# Patient Record
Sex: MALE | Race: WHITE
[De-identification: names, ages, dates, MRNs, and addresses within clinical notes are randomized per-mention and may not be internally consistent; named-entity substitution may affect disease eponyms.]

---

## 2019-03-19 ENCOUNTER — HOSPITAL ENCOUNTER (EMERGENCY)
Dept: HOSPITAL 89 - ER | Age: 34
Discharge: HOME | End: 2019-03-19
Payer: OTHER GOVERNMENT

## 2019-03-19 VITALS — DIASTOLIC BLOOD PRESSURE: 62 MMHG | SYSTOLIC BLOOD PRESSURE: 124 MMHG

## 2019-03-19 DIAGNOSIS — K29.00: Primary | ICD-10-CM

## 2019-03-19 LAB — PLATELET COUNT, AUTOMATED: 228 K/UL (ref 150–450)

## 2019-03-19 PROCEDURE — 84155 ASSAY OF PROTEIN SERUM: CPT

## 2019-03-19 PROCEDURE — 84295 ASSAY OF SERUM SODIUM: CPT

## 2019-03-19 PROCEDURE — 82040 ASSAY OF SERUM ALBUMIN: CPT

## 2019-03-19 PROCEDURE — 85025 COMPLETE CBC W/AUTO DIFF WBC: CPT

## 2019-03-19 PROCEDURE — 84132 ASSAY OF SERUM POTASSIUM: CPT

## 2019-03-19 PROCEDURE — 84075 ASSAY ALKALINE PHOSPHATASE: CPT

## 2019-03-19 PROCEDURE — 84450 TRANSFERASE (AST) (SGOT): CPT

## 2019-03-19 PROCEDURE — 82247 BILIRUBIN TOTAL: CPT

## 2019-03-19 PROCEDURE — 82565 ASSAY OF CREATININE: CPT

## 2019-03-19 PROCEDURE — 84520 ASSAY OF UREA NITROGEN: CPT

## 2019-03-19 PROCEDURE — 84484 ASSAY OF TROPONIN QUANT: CPT

## 2019-03-19 PROCEDURE — 96365 THER/PROPH/DIAG IV INF INIT: CPT

## 2019-03-19 PROCEDURE — 82310 ASSAY OF CALCIUM: CPT

## 2019-03-19 PROCEDURE — 86677 HELICOBACTER PYLORI ANTIBODY: CPT

## 2019-03-19 PROCEDURE — 82947 ASSAY GLUCOSE BLOOD QUANT: CPT

## 2019-03-19 PROCEDURE — 82435 ASSAY OF BLOOD CHLORIDE: CPT

## 2019-03-19 PROCEDURE — 82374 ASSAY BLOOD CARBON DIOXIDE: CPT

## 2019-03-19 PROCEDURE — 93005 ELECTROCARDIOGRAM TRACING: CPT

## 2019-03-19 PROCEDURE — 84460 ALANINE AMINO (ALT) (SGPT): CPT

## 2019-03-19 PROCEDURE — 83690 ASSAY OF LIPASE: CPT

## 2019-03-19 PROCEDURE — 99283 EMERGENCY DEPT VISIT LOW MDM: CPT

## 2019-03-19 NOTE — ER REPORT
History and Physical


Time Seen By MD:  10:25


Hx. of Stated Complaint:  


epigastric pain into throat since 6am. worse with eating/drinking/deep breathing


HPI/ROS


CHIEF COMPLAINT: Epigastric abdominal pain





HISTORY OF PRESENT ILLNESS: Patient is a 33-year-old male who presents to the 


emergency department with complaint of worsening epigastric abdominal pain 


episodes and symptoms of reflux over the last year. His pertinent past medical 


history is that he had his gallbladder taken out in 2012 for cholelithiasis and 


cholecystitis. Patient denies any fevers or chills. He states that the pain can 


come both with and without exertion and can last for 20-30 minutes. He states 


there is some relation to food but the pain can, if he has not eaten as well. He


denies any change in his bladder or bowel function. He denies shortness of 


breath. Denies any blood in his stools. Denies any black or tarry stools. 


Patient states he woke with this discomfort around 6 AM this morning that was 


fairly severe and unremitting. Upon arrival to the emergency department symptoms


are improving. He describes the discomfort as a tightness in the chest and 


epigastric area and also feels as if he is having reflux into his throat.





REVIEW OF SYSTEMS:


Constitutional: No fever, no chills.


Eyes: No discharge.


ENT: No sore throat. 


Cardiovascular: No chest pain, no palpitations.


Respiratory: No cough, no shortness of breath.


Gastrointestinal: Epigastric abdominal pain


Genitourinary: No hematuria.


Musculoskeletal: No back pain.


Skin: No rashes.


Neurological: No headache.


Allergies:  


Uncoded Allergies:  


     flu vaccine (Adverse Reaction, Intermediate, 3/19/19)


Home Meds


Reported Medications


Sertraline Hcl (SERTRALINE HCL) 50 Mg Tablet, 1 TAB PO QDAY, TAB


   3/19/19


Discontinued Reported Medications


[None]   No Conflict Check


   7/2/13


Past Medical/Surgical History


Past medical history for cholelithiasis and cholecystitis status post 


laparoscopic cholecystectomy 1/10/2012


Hx Smoking:  No


Hx Substance Use Disorder:  No


Hx Alcohol Use:  Yes (RARE)


Constitutional





Vital Sign - Last 24 Hours








 3/19/19





 10:13


 


Temp 99.2


 


Pulse 107


 


Resp 16


 


B/P (MAP) 134/86


 


Pulse Ox 93


 


O2 Delivery Room Air








Physical Exam


   General Appearance: The patient is alert, has no immediate need for airway 


protection and no signs of toxicity. 


Eyes: Pupils equal and round no pallor or injection.


ENT, Mouth: Mucous membranes are moist.


Respiratory: There are no retractions, lungs are clear to auscultation.


Cardiovascular: Regular rate and rhythm. 


Gastrointestinal:  Abdomen is soft and non tender, no masses, bowel sounds 


normal.


Neurological: Awake and alert


Skin: Warm and dry, no rashes.


Musculoskeletal:  Neck is supple non tender.


      Extremities are nontender, nonswollen and have full range of motion.





Medical Decision Making


Data Points


Result Diagram:  


3/19/19 1015                                                                    


            3/19/19 1015





Laboratory





Hematology








Test


 3/19/19


10:15


 


Red Blood Count


 5.37 M/uL


(4.00-5.60)


 


Mean Corpuscular Volume


 91.8 fL


(80.0-96.0)


 


Mean Corpuscular Hemoglobin


 31.9 pg


(26.0-33.0)


 


Mean Corpuscular Hemoglobin


Concent 34.7 g/dL


(32.0-36.0)


 


Red Cell Distribution Width


 13.4 %


(11.5-14.5)


 


Mean Platelet Volume


 7.9 fL


(7.2-11.1)


 


Neutrophils (%) (Auto)


 81.6 %


(39.4-72.5)


 


Lymphocytes (%) (Auto)


 10.4 %


(17.6-49.6)


 


Monocytes (%) (Auto)


 7.0 %


(4.1-12.4)


 


Eosinophils (%) (Auto)


 0.6 %


(0.4-6.7)


 


Basophils (%) (Auto)


 0.4 %


(0.3-1.4)


 


Nucleated RBC Relative Count


(auto) 0.0 /100WBC 





 


Neutrophils # (Auto)


 14.1 K/uL


(2.0-7.4)


 


Lymphocytes # (Auto)


 1.8 K/uL


(1.3-3.6)


 


Monocytes # (Auto)


 1.2 K/uL


(0.3-1.0)


 


Eosinophils # (Auto)


 0.1 K/uL


(0.0-0.5)


 


Basophils # (Auto)


 0.1 K/uL


(0.0-0.1)


 


Nucleated RBC Absolute Count


(auto) 0.00 K/uL 





 


Sodium Level


 141 mmol/L


(137-145)


 


Potassium Level


 4.1 mmol/L


(3.5-5.0)


 


Chloride Level


 109 mmol/L


()


 


Carbon Dioxide Level


 24 mmol/L


(22-30)


 


Blood Urea Nitrogen


 17 mg/dl


(9-21)


 


Creatinine


 1.00 mg/dl


(0.66-1.25)


 


Glomerular Filtration Rate


Calc > 60.0 





 


Random Glucose


 71 mg/dl


()


 


Calcium Level


 9.5 mg/dl


(8.4-10.2)


 


Total Bilirubin


 0.9 mg/dl


(0.2-1.3)


 


Aspartate Amino Transf


(AST/SGOT) 29 U/L (0-35) 





 


Alanine Aminotransferase


(ALT/SGPT) 22 U/L (0-56) 





 


Alkaline Phosphatase 51 U/L (0-126) 


 


Troponin I < 0.012 ng/ml 


 


Total Protein


 7.7 g/dl


(6.3-8.2)


 


Albumin


 4.6 g/dl


(3.5-5.0)


 


Lipase


 90 U/L


()


 


Helicobacter pylori IgG


Antibody Negative


(NEGATIVE)








Chemistry








Test


 3/19/19


10:15


 


White Blood Count


 17.3 k/uL


(4.5-11.0)


 


Red Blood Count


 5.37 M/uL


(4.00-5.60)


 


Hemoglobin


 17.1 g/dL


(14.0-18.0)


 


Hematocrit


 49.3 %


(42.0-52.0)


 


Mean Corpuscular Volume


 91.8 fL


(80.0-96.0)


 


Mean Corpuscular Hemoglobin


 31.9 pg


(26.0-33.0)


 


Mean Corpuscular Hemoglobin


Concent 34.7 g/dL


(32.0-36.0)


 


Red Cell Distribution Width


 13.4 %


(11.5-14.5)


 


Platelet Count


 228 K/uL


(150-450)


 


Mean Platelet Volume


 7.9 fL


(7.2-11.1)


 


Neutrophils (%) (Auto)


 81.6 %


(39.4-72.5)


 


Lymphocytes (%) (Auto)


 10.4 %


(17.6-49.6)


 


Monocytes (%) (Auto)


 7.0 %


(4.1-12.4)


 


Eosinophils (%) (Auto)


 0.6 %


(0.4-6.7)


 


Basophils (%) (Auto)


 0.4 %


(0.3-1.4)


 


Nucleated RBC Relative Count


(auto) 0.0 /100WBC 





 


Neutrophils # (Auto)


 14.1 K/uL


(2.0-7.4)


 


Lymphocytes # (Auto)


 1.8 K/uL


(1.3-3.6)


 


Monocytes # (Auto)


 1.2 K/uL


(0.3-1.0)


 


Eosinophils # (Auto)


 0.1 K/uL


(0.0-0.5)


 


Basophils # (Auto)


 0.1 K/uL


(0.0-0.1)


 


Nucleated RBC Absolute Count


(auto) 0.00 K/uL 





 


Glomerular Filtration Rate


Calc > 60.0 





 


Calcium Level


 9.5 mg/dl


(8.4-10.2)


 


Total Bilirubin


 0.9 mg/dl


(0.2-1.3)


 


Aspartate Amino Transf


(AST/SGOT) 29 U/L (0-35) 





 


Alanine Aminotransferase


(ALT/SGPT) 22 U/L (0-56) 





 


Alkaline Phosphatase 51 U/L (0-126) 


 


Troponin I < 0.012 ng/ml 


 


Total Protein


 7.7 g/dl


(6.3-8.2)


 


Albumin


 4.6 g/dl


(3.5-5.0)


 


Lipase


 90 U/L


()


 


Helicobacter pylori IgG


Antibody Negative


(NEGATIVE)











EKG/Imaging


EKG Interpretation


EKG shows sinus rhythm with a ventricular rate of 96 bpm no significant ST seg


ment or T-wave abnormalities are noted.


Monitor Interpretation:  Normal Sinus Rhythm





ED Course/Re-evaluation


ED Course


 03/19/2019 10:42:41 am patient with what sounds like symptoms of reflux. 


Unlikely cardiac however given he has been having frequent episodes over the 


past few weeks we'll check EKG and troponin. Also check LFTs and lipase. We'll 


give patient Maalox and Pepcid.





 03/19/2019 11:09:16 am patient with symptomatic improvement after Pepcid and 


Maalox. LFTs and lipase along with H. pylori tests all unremarkable. Patient 


does have an elevated white count of 17,000 but no obvious infectious symptoms 


including fevers chills or significant reproducible peritonitis. Plan at this 


time will be to discharge home placing the patient on a proton pump inhibitor. I


 will refer him to Dr. Howard for follow-up if symptoms persist


Decision to Disposition Date:  Mar 19, 2019


Decision to Disposition Time:  11:09





Depart


Departure


Latest Vital Signs





Vital Signs








  Date Time  Temp Pulse Resp B/P (MAP) Pulse Ox O2 Delivery O2 Flow Rate FiO2


 


3/19/19 10:13 99.2 107 16 134/86 93 Room Air  








Impression:  


   Primary Impression:  


   Gastritis


Condition:  Improved


Disposition:  HOME OR SELF-CARE


Referrals:  


SOLANGE HOWARD


Make a follow up appointment if symptoms persist for more than 30 days or worsen


 at any time


New Scripts


Pantoprazole Sodium (PANTOPRAZOLE SODIUM) 40 Mg Tablet.dr


40 MG PO QDAY for 90 Days, #90 TAB.SR 1 Refill


   Prov: LEN MCKINNEY MD         3/19/19


Patient Instructions:  Diet for Stomach Ulcers and Gastritis (GEN), Gastritis 


(ED)





Problem Qualifiers








   Primary Impression:  


   Gastritis


   Gastritis type:  unspecified gastritis  Chronicity:  acute  Gastritis 


   bleeding:  without bleeding  Qualified Codes:  K29.00 - Acute gastritis 


   without bleeding








LEN MCKINNEY MD             Mar 19, 2019 10:26

## 2019-03-19 NOTE — EKG
FACILITY: [facility] [place] 

 

PATIENT NAME: QIANA SHORT

: [id number]

MR: [id number]

V: [id number]

EXAM DATE: 

ORDERING PHYSICIAN: LEN MCKINNEY

TECHNOLOGIST: 

 

Test Reason : epigastric pain

Blood Pressure : ***/*** mmHG

Vent. Rate : 096 BPM     Atrial Rate : 096 BPM

   P-R Int : 146 ms          QRS Dur : 088 ms

    QT Int : 338 ms       P-R-T Axes : 065 -07 029 degrees

   QTc Int : 427 ms

 

Normal sinus rhythm

Normal ECG

No previous ECGs available

Confirmed by Rigo Cruz ([id number]) on 3/20/2019 12:04:43 AM

 

Referred By:             Confirmed By:Rigo Ureña